# Patient Record
Sex: MALE | Race: WHITE | NOT HISPANIC OR LATINO | Employment: STUDENT | URBAN - METROPOLITAN AREA
[De-identification: names, ages, dates, MRNs, and addresses within clinical notes are randomized per-mention and may not be internally consistent; named-entity substitution may affect disease eponyms.]

---

## 2020-10-21 ENCOUNTER — LAB VISIT (OUTPATIENT)
Dept: LAB | Facility: HOSPITAL | Age: 20
End: 2020-10-21
Attending: FAMILY MEDICINE
Payer: COMMERCIAL

## 2020-10-21 DIAGNOSIS — A64 STD (MALE): ICD-10-CM

## 2020-10-21 DIAGNOSIS — Z09 FOLLOW UP: ICD-10-CM

## 2020-10-21 DIAGNOSIS — Z00.00 ANNUAL PHYSICAL EXAM: ICD-10-CM

## 2020-10-21 LAB
ALBUMIN SERPL BCP-MCNC: 4.2 G/DL (ref 3.5–5.2)
ALP SERPL-CCNC: 93 U/L (ref 55–135)
ALT SERPL W/O P-5'-P-CCNC: 32 U/L (ref 10–44)
ANION GAP SERPL CALC-SCNC: 11 MMOL/L (ref 8–16)
AST SERPL-CCNC: 15 U/L (ref 10–40)
BASOPHILS # BLD AUTO: 0.08 K/UL (ref 0–0.2)
BASOPHILS NFR BLD: 0.6 % (ref 0–1.9)
BILIRUB SERPL-MCNC: 1.2 MG/DL (ref 0.1–1)
BUN SERPL-MCNC: 10 MG/DL (ref 6–20)
CALCIUM SERPL-MCNC: 8.7 MG/DL (ref 8.7–10.5)
CHLORIDE SERPL-SCNC: 103 MMOL/L (ref 95–110)
CHOLEST SERPL-MCNC: 141 MG/DL (ref 120–199)
CHOLEST/HDLC SERPL: 4.1 {RATIO} (ref 2–5)
CO2 SERPL-SCNC: 26 MMOL/L (ref 23–29)
CREAT SERPL-MCNC: 1.1 MG/DL (ref 0.5–1.4)
DIFFERENTIAL METHOD: ABNORMAL
EOSINOPHIL # BLD AUTO: 0.1 K/UL (ref 0–0.5)
EOSINOPHIL NFR BLD: 1 % (ref 0–8)
ERYTHROCYTE [DISTWIDTH] IN BLOOD BY AUTOMATED COUNT: 14.6 % (ref 11.5–14.5)
EST. GFR  (AFRICAN AMERICAN): >60 ML/MIN/1.73 M^2
EST. GFR  (NON AFRICAN AMERICAN): >60 ML/MIN/1.73 M^2
ESTIMATED AVG GLUCOSE: 100 MG/DL (ref 68–131)
GLUCOSE SERPL-MCNC: 95 MG/DL (ref 70–110)
HBA1C MFR BLD HPLC: 5.1 % (ref 4–5.6)
HCT VFR BLD AUTO: 56.6 % (ref 40–54)
HDLC SERPL-MCNC: 34 MG/DL (ref 40–75)
HDLC SERPL: 24.1 % (ref 20–50)
HGB BLD-MCNC: 17.8 G/DL (ref 14–18)
IMM GRANULOCYTES # BLD AUTO: 0.05 K/UL (ref 0–0.04)
IMM GRANULOCYTES NFR BLD AUTO: 0.4 % (ref 0–0.5)
LDLC SERPL CALC-MCNC: 85.4 MG/DL (ref 63–159)
LYMPHOCYTES # BLD AUTO: 2.7 K/UL (ref 1–4.8)
LYMPHOCYTES NFR BLD: 21.3 % (ref 18–48)
MCH RBC QN AUTO: 27.5 PG (ref 27–31)
MCHC RBC AUTO-ENTMCNC: 31.4 G/DL (ref 32–36)
MCV RBC AUTO: 87 FL (ref 82–98)
MONOCYTES # BLD AUTO: 1 K/UL (ref 0.3–1)
MONOCYTES NFR BLD: 7.9 % (ref 4–15)
NEUTROPHILS # BLD AUTO: 8.7 K/UL (ref 1.8–7.7)
NEUTROPHILS NFR BLD: 68.8 % (ref 38–73)
NONHDLC SERPL-MCNC: 107 MG/DL
NRBC BLD-RTO: 0 /100 WBC
PLATELET # BLD AUTO: 251 K/UL (ref 150–350)
PMV BLD AUTO: 13.3 FL (ref 9.2–12.9)
POTASSIUM SERPL-SCNC: 3.5 MMOL/L (ref 3.5–5.1)
PROT SERPL-MCNC: 7.9 G/DL (ref 6–8.4)
RBC # BLD AUTO: 6.48 M/UL (ref 4.6–6.2)
SODIUM SERPL-SCNC: 140 MMOL/L (ref 136–145)
T3 SERPL-MCNC: 131 NG/DL (ref 60–180)
T4 FREE SERPL-MCNC: 1.29 NG/DL (ref 0.71–1.51)
TRIGL SERPL-MCNC: 108 MG/DL (ref 30–150)
TSH SERPL DL<=0.005 MIU/L-ACNC: 2.51 UIU/ML (ref 0.4–4)
WBC # BLD AUTO: 12.71 K/UL (ref 3.9–12.7)

## 2020-10-21 PROCEDURE — 80053 COMPREHEN METABOLIC PANEL: CPT

## 2020-10-21 PROCEDURE — 86696 HERPES SIMPLEX TYPE 2 TEST: CPT

## 2020-10-21 PROCEDURE — 87340 HEPATITIS B SURFACE AG IA: CPT

## 2020-10-21 PROCEDURE — 86803 HEPATITIS C AB TEST: CPT

## 2020-10-21 PROCEDURE — 86705 HEP B CORE ANTIBODY IGM: CPT

## 2020-10-21 PROCEDURE — 86592 SYPHILIS TEST NON-TREP QUAL: CPT

## 2020-10-21 PROCEDURE — 80061 LIPID PANEL: CPT

## 2020-10-21 PROCEDURE — 83036 HEMOGLOBIN GLYCOSYLATED A1C: CPT

## 2020-10-21 PROCEDURE — 84439 ASSAY OF FREE THYROXINE: CPT

## 2020-10-21 PROCEDURE — 36415 COLL VENOUS BLD VENIPUNCTURE: CPT

## 2020-10-21 PROCEDURE — 86709 HEPATITIS A IGM ANTIBODY: CPT

## 2020-10-21 PROCEDURE — 86703 HIV-1/HIV-2 1 RESULT ANTBDY: CPT

## 2020-10-21 PROCEDURE — 84480 ASSAY TRIIODOTHYRONINE (T3): CPT

## 2020-10-21 PROCEDURE — 84443 ASSAY THYROID STIM HORMONE: CPT

## 2020-10-21 PROCEDURE — 85025 COMPLETE CBC W/AUTO DIFF WBC: CPT

## 2020-10-22 LAB
HAV IGM SERPL QL IA: NEGATIVE
HBV CORE IGM SERPL QL IA: NEGATIVE
HBV SURFACE AG SERPL QL IA: NEGATIVE
HCV AB SERPL QL IA: NEGATIVE
HIV 1+2 AB+HIV1 P24 AG SERPL QL IA: NEGATIVE
RPR SER QL: NORMAL

## 2020-10-24 LAB
HSV1 IGG SERPL QL IA: NEGATIVE
HSV2 IGG SERPL QL IA: NEGATIVE

## 2020-11-09 ENCOUNTER — LAB VISIT (OUTPATIENT)
Dept: INTERNAL MEDICINE | Facility: CLINIC | Age: 20
End: 2020-11-09
Payer: COMMERCIAL

## 2020-11-09 DIAGNOSIS — Z03.818 ENCOUNTER FOR OBSERVATION FOR SUSPECTED EXPOSURE TO OTHER BIOLOGICAL AGENTS RULED OUT: ICD-10-CM

## 2020-11-09 LAB — SARS-COV-2 RNA RESP QL NAA+PROBE: NOT DETECTED

## 2020-11-09 PROCEDURE — U0003 INFECTIOUS AGENT DETECTION BY NUCLEIC ACID (DNA OR RNA); SEVERE ACUTE RESPIRATORY SYNDROME CORONAVIRUS 2 (SARS-COV-2) (CORONAVIRUS DISEASE [COVID-19]), AMPLIFIED PROBE TECHNIQUE, MAKING USE OF HIGH THROUGHPUT TECHNOLOGIES AS DESCRIBED BY CMS-2020-01-R: HCPCS

## 2021-03-10 ENCOUNTER — IMMUNIZATION (OUTPATIENT)
Dept: OBSTETRICS AND GYNECOLOGY | Facility: CLINIC | Age: 21
End: 2021-03-10
Payer: COMMERCIAL

## 2021-03-10 DIAGNOSIS — Z23 NEED FOR VACCINATION: Primary | ICD-10-CM

## 2021-03-10 PROCEDURE — 91300 COVID-19, MRNA, LNP-S, PF, 30 MCG/0.3 ML DOSE VACCINE: CPT | Mod: PBBFAC | Performed by: FAMILY MEDICINE

## 2021-03-31 ENCOUNTER — IMMUNIZATION (OUTPATIENT)
Dept: OBSTETRICS AND GYNECOLOGY | Facility: CLINIC | Age: 21
End: 2021-03-31
Payer: COMMERCIAL

## 2021-03-31 DIAGNOSIS — Z23 NEED FOR VACCINATION: Primary | ICD-10-CM

## 2021-03-31 PROCEDURE — 91300 COVID-19, MRNA, LNP-S, PF, 30 MCG/0.3 ML DOSE VACCINE: CPT | Mod: PBBFAC | Performed by: FAMILY MEDICINE

## 2021-03-31 PROCEDURE — 0002A COVID-19, MRNA, LNP-S, PF, 30 MCG/0.3 ML DOSE VACCINE: CPT | Mod: PBBFAC | Performed by: FAMILY MEDICINE

## 2021-07-31 ENCOUNTER — LAB VISIT (OUTPATIENT)
Dept: INTERNAL MEDICINE | Facility: CLINIC | Age: 21
End: 2021-07-31
Payer: COMMERCIAL

## 2021-07-31 DIAGNOSIS — Z03.818 ENCOUNTER FOR OBSERVATION FOR SUSPECTED EXPOSURE TO OTHER BIOLOGICAL AGENTS RULED OUT: ICD-10-CM

## 2021-07-31 PROCEDURE — U0003 INFECTIOUS AGENT DETECTION BY NUCLEIC ACID (DNA OR RNA); SEVERE ACUTE RESPIRATORY SYNDROME CORONAVIRUS 2 (SARS-COV-2) (CORONAVIRUS DISEASE [COVID-19]), AMPLIFIED PROBE TECHNIQUE, MAKING USE OF HIGH THROUGHPUT TECHNOLOGIES AS DESCRIBED BY CMS-2020-01-R: HCPCS | Performed by: FAMILY MEDICINE

## 2021-07-31 PROCEDURE — U0005 INFEC AGEN DETEC AMPLI PROBE: HCPCS | Performed by: FAMILY MEDICINE

## 2021-08-02 LAB
SARS-COV-2 RNA RESP QL NAA+PROBE: NOT DETECTED
SARS-COV-2- CYCLE NUMBER: -1

## 2021-10-21 ENCOUNTER — IMMUNIZATION (OUTPATIENT)
Dept: INTERNAL MEDICINE | Facility: CLINIC | Age: 21
End: 2021-10-21
Payer: COMMERCIAL

## 2021-10-21 DIAGNOSIS — Z23 NEED FOR VACCINATION: Primary | ICD-10-CM

## 2021-10-21 PROCEDURE — 91300 COVID-19, MRNA, LNP-S, PF, 30 MCG/0.3 ML DOSE VACCINE: CPT | Mod: PBBFAC | Performed by: INTERNAL MEDICINE

## 2021-10-21 PROCEDURE — 0003A COVID-19, MRNA, LNP-S, PF, 30 MCG/0.3 ML DOSE VACCINE: CPT | Mod: PBBFAC | Performed by: INTERNAL MEDICINE

## 2021-11-23 ENCOUNTER — CLINICAL SUPPORT (OUTPATIENT)
Dept: URGENT CARE | Facility: CLINIC | Age: 21
End: 2021-11-23
Payer: COMMERCIAL

## 2021-11-23 DIAGNOSIS — Z20.822 CONTACT WITH AND (SUSPECTED) EXPOSURE TO COVID-19: Primary | ICD-10-CM

## 2021-11-23 LAB
CTP QC/QA: YES
SARS-COV-2 RDRP RESP QL NAA+PROBE: NEGATIVE

## 2021-11-23 PROCEDURE — U0002: ICD-10-PCS | Mod: QW,S$GLB,,

## 2021-11-23 PROCEDURE — 99211 PR OFFICE/OUTPT VISIT, EST, LEVL I: ICD-10-PCS | Mod: S$GLB,CS,,

## 2021-11-23 PROCEDURE — U0002 COVID-19 LAB TEST NON-CDC: HCPCS | Mod: QW,S$GLB,,

## 2021-11-23 PROCEDURE — 99211 OFF/OP EST MAY X REQ PHY/QHP: CPT | Mod: S$GLB,CS,,

## 2021-12-17 ENCOUNTER — CLINICAL SUPPORT (OUTPATIENT)
Dept: URGENT CARE | Facility: CLINIC | Age: 21
End: 2021-12-17
Payer: COMMERCIAL

## 2021-12-17 DIAGNOSIS — Z20.822 ENCOUNTER FOR LABORATORY TESTING FOR COVID-19 VIRUS: Primary | ICD-10-CM

## 2021-12-17 LAB
CTP QC/QA: YES
SARS-COV-2 RDRP RESP QL NAA+PROBE: NEGATIVE

## 2021-12-17 PROCEDURE — U0002: ICD-10-PCS | Mod: QW,S$GLB,, | Performed by: PHYSICIAN ASSISTANT

## 2021-12-17 PROCEDURE — U0002 COVID-19 LAB TEST NON-CDC: HCPCS | Mod: QW,S$GLB,, | Performed by: PHYSICIAN ASSISTANT

## 2022-11-08 ENCOUNTER — HOSPITAL ENCOUNTER (EMERGENCY)
Facility: HOSPITAL | Age: 22
Discharge: HOME | End: 2022-11-08
Attending: EMERGENCY MEDICINE
Payer: COMMERCIAL

## 2022-11-08 VITALS
SYSTOLIC BLOOD PRESSURE: 179 MMHG | HEART RATE: 87 BPM | RESPIRATION RATE: 16 BRPM | WEIGHT: 315 LBS | HEIGHT: 73 IN | DIASTOLIC BLOOD PRESSURE: 86 MMHG | BODY MASS INDEX: 41.75 KG/M2 | OXYGEN SATURATION: 98 % | TEMPERATURE: 98 F

## 2022-11-08 DIAGNOSIS — N45.1 EPIDIDYMITIS, RIGHT: Primary | ICD-10-CM

## 2022-11-08 DIAGNOSIS — N50.811 PAIN IN RIGHT TESTICLE: ICD-10-CM

## 2022-11-08 LAB
BILIRUB UR QL STRIP: NEGATIVE
BUN SERPL-MCNC: 15 MG/DL (ref 6–30)
CHLORIDE SERPL-SCNC: 104 MMOL/L (ref 95–110)
CLARITY UR REFRACT.AUTO: CLEAR
COLOR UR AUTO: YELLOW
CREAT SERPL-MCNC: 1.2 MG/DL (ref 0.5–1.4)
GLUCOSE SERPL-MCNC: 92 MG/DL (ref 70–110)
GLUCOSE UR QL STRIP: NEGATIVE
HCT VFR BLD CALC: 50 %PCV (ref 36–54)
HCV AB SERPL QL IA: NORMAL
HGB UR QL STRIP: NEGATIVE
HIV 1+2 AB+HIV1 P24 AG SERPL QL IA: NORMAL
KETONES UR QL STRIP: NEGATIVE
LEUKOCYTE ESTERASE UR QL STRIP: NEGATIVE
NITRITE UR QL STRIP: NEGATIVE
PH UR STRIP: 7 [PH] (ref 5–8)
POC IONIZED CALCIUM: 1.15 MMOL/L (ref 1.06–1.42)
POC TCO2 (MEASURED): 26 MMOL/L (ref 23–29)
POTASSIUM BLD-SCNC: 3.9 MMOL/L (ref 3.5–5.1)
PROT UR QL STRIP: NEGATIVE
SAMPLE: NORMAL
SODIUM BLD-SCNC: 143 MMOL/L (ref 136–145)
SP GR UR STRIP: 1.02 (ref 1–1.03)
URN SPEC COLLECT METH UR: NORMAL

## 2022-11-08 PROCEDURE — 87591 N.GONORRHOEAE DNA AMP PROB: CPT | Mod: 59 | Performed by: EMERGENCY MEDICINE

## 2022-11-08 PROCEDURE — 86803 HEPATITIS C AB TEST: CPT | Performed by: EMERGENCY MEDICINE

## 2022-11-08 PROCEDURE — 87491 CHLMYD TRACH DNA AMP PROBE: CPT | Mod: 59 | Performed by: EMERGENCY MEDICINE

## 2022-11-08 PROCEDURE — 87389 HIV-1 AG W/HIV-1&-2 AB AG IA: CPT | Performed by: EMERGENCY MEDICINE

## 2022-11-08 PROCEDURE — 63600175 PHARM REV CODE 636 W HCPCS: Performed by: PHYSICIAN ASSISTANT

## 2022-11-08 PROCEDURE — 81003 URINALYSIS AUTO W/O SCOPE: CPT | Performed by: EMERGENCY MEDICINE

## 2022-11-08 PROCEDURE — 99284 EMERGENCY DEPT VISIT MOD MDM: CPT | Mod: ,,, | Performed by: PHYSICIAN ASSISTANT

## 2022-11-08 PROCEDURE — 99285 EMERGENCY DEPT VISIT HI MDM: CPT | Mod: 25

## 2022-11-08 PROCEDURE — 99284 PR EMERGENCY DEPT VISIT,LEVEL IV: ICD-10-PCS | Mod: ,,, | Performed by: PHYSICIAN ASSISTANT

## 2022-11-08 PROCEDURE — 87591 N.GONORRHOEAE DNA AMP PROB: CPT | Performed by: PHYSICIAN ASSISTANT

## 2022-11-08 PROCEDURE — 87491 CHLMYD TRACH DNA AMP PROBE: CPT | Performed by: PHYSICIAN ASSISTANT

## 2022-11-08 PROCEDURE — 96374 THER/PROPH/DIAG INJ IV PUSH: CPT

## 2022-11-08 RX ORDER — LEVOFLOXACIN 500 MG/1
500 TABLET, FILM COATED ORAL
Status: DISCONTINUED | OUTPATIENT
Start: 2022-11-08 | End: 2022-11-08 | Stop reason: HOSPADM

## 2022-11-08 RX ORDER — LEVOFLOXACIN 500 MG/1
500 TABLET, FILM COATED ORAL DAILY
Qty: 9 TABLET | Refills: 0 | Status: SHIPPED | OUTPATIENT
Start: 2022-11-08 | End: 2022-11-17

## 2022-11-08 RX ORDER — DEXTROAMPHETAMINE SACCHARATE, AMPHETAMINE ASPARTATE, DEXTROAMPHETAMINE SULFATE AND AMPHETAMINE SULFATE 3.125; 3.125; 3.125; 3.125 MG/1; MG/1; MG/1; MG/1
25 TABLET ORAL DAILY
COMMUNITY

## 2022-11-08 RX ORDER — KETOROLAC TROMETHAMINE 30 MG/ML
10 INJECTION, SOLUTION INTRAMUSCULAR; INTRAVENOUS
Status: COMPLETED | OUTPATIENT
Start: 2022-11-08 | End: 2022-11-08

## 2022-11-08 RX ORDER — CEFTRIAXONE 500 MG/1
250 INJECTION, POWDER, FOR SOLUTION INTRAMUSCULAR; INTRAVENOUS
Status: DISCONTINUED | OUTPATIENT
Start: 2022-11-08 | End: 2022-11-08 | Stop reason: HOSPADM

## 2022-11-08 RX ADMIN — KETOROLAC TROMETHAMINE 10 MG: 30 INJECTION, SOLUTION INTRAMUSCULAR; INTRAVENOUS at 08:11

## 2022-11-08 NOTE — FIRST PROVIDER EVALUATION
"Medical screening examination initiated.  I have conducted a focused provider triage encounter, findings are as follows:    Brief history of present illness:  21 y/o with right testicle pain and dysuria dark urine. No fevers, chills, N/V/Norm trauma. Started last week but worsened 2 days ago.     Vitals:    11/08/22 1709   BP: (!) 178/104   Pulse: 82   Resp: 16   Temp: 97.9 °F (36.6 °C)   TempSrc: Oral   SpO2: 99%   Weight: (!) 165.6 kg (365 lb)   Height: 6' 1" (1.854 m)       Pertinent physical exam:  deferred  exam to examining physician    Brief workup plan:  UA, testicular US, Istat, GC    Preliminary workup initiated; this workup will be continued and followed by the physician or advanced practice provider that is assigned to the patient when roomed.    Adarsh Elizabeth DO, FAAEM  Emergency Staff Physician   Dept of Emergency Medicine   [facility]  Spectralink: [id number]        Disclaimer: This note has been generated using voice-recognition software. There may be typographical errors that have been missed during proof-reading.    "

## 2022-11-09 LAB
C TRACH DNA SPEC QL NAA+PROBE: NOT DETECTED
N GONORRHOEA DNA SPEC QL NAA+PROBE: NOT DETECTED

## 2022-11-09 NOTE — DISCHARGE INSTRUCTIONS
Follow-up with Urology for further evaluation. Use the below contact information to obtain an appointment.    Take the prescribed Levaquin as directed for further management. Do not do strenuous exercise while taking this medication as it can cause ligament injuries.     You may use over-the-counter Tylenol and Ibuprofen for further treatment of your pain.

## 2022-11-09 NOTE — ED PROVIDER NOTES
Encounter Date: 11/8/2022       History     Chief Complaint   Patient presents with    Testicle Pain     Patient reports right testicle pain that started last week and symptoms worsened over the past two days. Patient reports dysuria and hematuria. Denies trauma.      The history is provided by the patient and medical records. No  was used.     Jl Cantor is a 22 y.o. male with medical history of Asperger syndrome presenting to the ED with the chief complaint of testicle pain.     Reports developing R testicle pain and swelling yesterday. Symptoms worsen whenever he sits down. Denies dysuria, hematuria, penile discharge, concerns for STD. He is not sexually active. He has not ejaculated. Denies history of pelvic or abdominal surgeries. No fever, chest pain, SOB, abdominal pain, vomiting, diarrhea, constipation.     Review of patient's allergies indicates:  No Known Allergies  Past Medical History:   Diagnosis Date    ADHD (attention deficit hyperactivity disorder)     Asperger syndrome      History reviewed. No pertinent surgical history.  History reviewed. No pertinent family history.  Social History     Tobacco Use    Smoking status: Every Day     Packs/day: 0.50     Types: Cigarettes    Smokeless tobacco: Never   Substance Use Topics    Alcohol use: Yes     Comment: weekly    Drug use: Never     Review of Systems   Constitutional:  Negative for fever.   HENT:  Negative for sore throat.    Eyes:  Negative for pain.   Respiratory:  Negative for shortness of breath.    Cardiovascular:  Negative for chest pain.   Gastrointestinal:  Negative for nausea.   Genitourinary:  Positive for scrotal swelling and testicular pain. Negative for dysuria and penile pain.   Musculoskeletal:  Negative for back pain.   Skin:  Negative for rash.   Neurological:  Negative for weakness.   Hematological:  Does not bruise/bleed easily.     Physical Exam     Initial Vitals [11/08/22 1709]   BP Pulse Resp Temp  SpO2   (!) 178/104 82 16 97.9 °F (36.6 °C) 99 %      MAP       --         Physical Exam    Constitutional: He appears well-developed and well-nourished. He is not diaphoretic. He is easily aroused.   Obese male   HENT:   Head: Normocephalic and atraumatic.   Mouth/Throat: Oropharynx is clear and moist. No oropharyngeal exudate.   Eyes: EOM and lids are normal. Pupils are equal, round, and reactive to light. No scleral icterus.   Neck: Phonation normal. Neck supple. No stridor present.   Normal range of motion.  Cardiovascular:  Normal rate and regular rhythm.           Pulmonary/Chest: Breath sounds normal. No stridor. No respiratory distress. He has no wheezes. He has no rales.   Abdominal: Abdomen is soft. He exhibits no distension. There is no abdominal tenderness. Hernia confirmed negative in the right inguinal area. There is no rebound.   Genitourinary: Right testis shows swelling and tenderness. Uncircumcised. No hypospadias, penile erythema or penile tenderness. No discharge found.   Musculoskeletal:         General: No tenderness or edema. Normal range of motion.      Cervical back: Normal range of motion and neck supple.     Neurological: He is alert, oriented to person, place, and time and easily aroused. He has normal strength. No sensory deficit.   Skin: Skin is warm and dry. No rash noted. No erythema.   Psychiatric: He has a normal mood and affect. His speech is normal.       ED Course   Procedures  Labs Reviewed   C. TRACHOMATIS/N. GONORRHOEAE BY AMP DNA   C. TRACHOMATIS/N. GONORRHOEAE BY AMP DNA   URINALYSIS, REFLEX TO URINE CULTURE    Narrative:     Specimen Source->Urine   HIV 1 / 2 ANTIBODY    Narrative:     Release to patient->Immediate   HEPATITIS C ANTIBODY    Narrative:     Release to patient->Immediate   ISTAT PROCEDURE   ISTAT CHEM8          Imaging Results              US Scrotum And Testicles (Final result)  Result time 11/08/22 19:03:12      Final result by Aric Charles MD (11/08/22  19:03:12)                   Impression:      Findings most consistent with right epididymitis, no orchitis at this time.  There is small reactive hydrocele.    Striated appearance of the right testicle, correlation with any history of previous injury or prior orchitis.  No vascular compromise at this time.    Right epididymal head cyst.    Electronically signed by resident: Дмитрий Landry  Date:    11/08/2022  Time:    18:50    Electronically signed by: Aric Charles MD  Date:    11/08/2022  Time:    19:03               Narrative:    EXAMINATION:  US SCROTUM AND TESTICLES    CLINICAL HISTORY:  Right testicular pain    TECHNIQUE:  Sonography of the scrotum and testes.    COMPARISON:  None.    FINDINGS:  The right testicle measures 5.5 x 3.5 x 3.3 cm with striated appearance..  Arterial and venous flow is documented to the right testicle without hyperemia.  There is a right epididymal head cyst measuring 0.7 cm.  The right epididymis is thickened measuring 1.1 cm with diffuse increased vascularity.  There is a small right hydrocele.  No right varicocele.    There is no right-sided varicocele.    The left testicle measures 5.6 x 3.3 x 4.2 cm with homogeneous echotexture.  Arterial and venous flow is documented to the left testicle without abnormal testicular or epididymal hyperemia. The left epididymis is unremarkable.    There is no left-sided varicocele or hydrocele.                                       Medications   cefTRIAXone injection 250 mg (has no administration in time range)   levoFLOXacin tablet 500 mg (has no administration in time range)   ketorolac injection 9.999 mg (9.999 mg Intravenous Given 11/8/22 2013)     Medical Decision Making:   History:   Old Medical Records: I decided to obtain old medical records.  Old Records Summarized: records from clinic visits.  Clinical Tests:   Lab Tests: Ordered and Reviewed  Radiological Study: Ordered and Reviewed     APC / Resident Notes:   22 y.o. male with  medical history of Asperger syndrome presenting to the ED c/o 2 days of R testicular pain and  swelling. DDx includes but not limited to orchitis, epididymitis, hydrocele, hernia, UTI.           ED Course as of 11/08/22 2203   Tue Nov 08, 2022 2111 U/S showing right epididymitis, no orchitis at this time. No torsion. UA negative for infection. [BA]   2112 Reports improvement in pain after Toradol [BA]   2112 Patient denies history of STI and has lower suspicion for one at this time. Will treat for both STI and enteric organism. Rocephin IM x1 given and RX for Levaquin provided. Levaquin precautions given. Patient expresses understanding and agreeable to the plan. Return to ED precautions given for new, worsening, or concerning symptoms. [BA]      ED Course User Index  [BA] Madi Braswell PA-C                 Clinical Impression:   Final diagnoses:  [N50.811] Pain in right testicle  [N45.1] Epididymitis, right (Primary)        ED Disposition Condition    Discharge Stable          ED Prescriptions       Medication Sig Dispense Start Date End Date Auth. Provider    levoFLOXacin (LEVAQUIN) 500 MG tablet Take 1 tablet (500 mg total) by mouth once daily. for 9 days 9 tablet 11/8/2022 11/17/2022 Madi Braswell PA-C          Follow-up Information       Follow up With Specialties Details Why Contact Info Additional Information    [facility]   [address]  [facility] [id number] [phone] Main Building, 4th Floor Please park in [facility] and take [facility] elevator             Madi Braswell PA-C  11/08/22 2203

## 2022-11-09 NOTE — ED NOTES
Patient identifiers verified and correct for Mr Cantor  C/C:  Testicle pain SEE NN  APPEARANCE: awake and alert in NAD.  SKIN: warm, dry and intact. No breakdown or bruising.  MUSCULOSKELETAL: Patient moving all extremities spontaneously, no obvious swelling or deformities noted. Ambulates independently.  RESPIRATORY: Denies shortness of breath.Respirations unlabored.   CARDIAC: Denies CP, 2+ distal pulses; no peripheral edema  ABDOMEN: S/ND/NT, Denies nausea  : voids spontaneously, denies difficulty  Neurologic: AAO x 4; follows commands equal strength in all extremities; denies numbness/tingling. Denies dizziness  Denies new weakness, reports right testicle pain , not observed

## 2022-11-09 NOTE — ED NOTES
I-STAT Chem-8+ Results:   Value Reference Range   Sodium 143 136-145 mmol/L   Potassium  3.9 3.5-5.1 mmol/L   Chloride 104  mmol/L   Ionized Calcium 1.15 1.06-1.42 mmol/L   CO2 (measured) 26 23-29 mmol/L   Glucose 92  mg/dL   BUN 15 6-30 mg/dL   Creatinine 1.2 0.5-1.4 mg/dL   Hematocrit 50 36-54%

## 2025-03-13 ENCOUNTER — OFFICE VISIT (OUTPATIENT)
Dept: INTERNAL MEDICINE | Facility: CLINIC | Age: 25
End: 2025-03-13
Payer: COMMERCIAL

## 2025-03-13 VITALS
SYSTOLIC BLOOD PRESSURE: 122 MMHG | OXYGEN SATURATION: 100 % | BODY MASS INDEX: 41.75 KG/M2 | HEART RATE: 79 BPM | WEIGHT: 315 LBS | DIASTOLIC BLOOD PRESSURE: 87 MMHG | HEIGHT: 73 IN

## 2025-03-13 DIAGNOSIS — Z01.30 ENCOUNTER FOR BLOOD PRESSURE EXAMINATION: ICD-10-CM

## 2025-03-13 DIAGNOSIS — F90.9 ATTENTION DEFICIT HYPERACTIVITY DISORDER (ADHD), UNSPECIFIED ADHD TYPE: ICD-10-CM

## 2025-03-13 DIAGNOSIS — I10 PRIMARY HYPERTENSION: Primary | ICD-10-CM

## 2025-03-13 PROCEDURE — 99999 PR PBB SHADOW E&M-EST. PATIENT-LVL IV: CPT | Mod: PBBFAC,,, | Performed by: NURSE PRACTITIONER

## 2025-03-13 RX ORDER — ATENOLOL 50 MG/1
75 TABLET ORAL DAILY
Start: 2025-03-13

## 2025-03-13 RX ORDER — TIRZEPATIDE 2.5 MG/.5ML
2.5 INJECTION, SOLUTION SUBCUTANEOUS
COMMUNITY
Start: 2025-02-13

## 2025-03-13 NOTE — Clinical Note
Renetta Brito,  Wanted to respectfully share chart notations from the visit today on [facility].. please see the attached.   Best,  ` SDJ

## 2025-03-13 NOTE — PROGRESS NOTES
"INTERNAL MEDICINE CLINIC - SAME DAY APPOINTMENT  Progress Note    PRESENTING HISTORY     PCP: Shiv Brito MD    Chief Complaint/Reason for Visit:   No chief complaint on file.     History of Present Illness & ROS : Mr. Jl Cantor is a 24 y.o. male.    Same day apt.   New to me and practice site.   Pleasant gentleman.   Est;d with with Dr. Brito and reports that he missed to his follow up since being started on Atenolol, not been consistently monitoring at home, but his Psychiatrist 'will not refill the "Vyvanse due to elevated blood pressures when gets to clinic'. He denies missing any of his Atenolol. Does endorse 'lots of stress at work'. Does endorse 'headaches' on occasion, no chest pain or SOB or cough endorsed.   No dizziness or vision changes.   He does take the Atenolol in the morning and reports, 'typically go see my Psychiatrist shortly afterwards'.   Of note to make, BP is in acceptable range this evening : 122/87, HR: 79.   He is requesting to 'see what can be done for blood pressures'.   He reports the following from when seen by Dr. Dacosta, Psychiatrist at [facility] on the most recent visits since starting the Atenolol:   12/10/2024: 184/94 (HR 85)  01/09/2025: 195/85 (HR 80)  02/06/2025: 177/93 (HR 75)  03/11/2025: 196/94 (HR 84)  Today in clinic with me: 122/87 (HR 79)    Review of Systems:  Eyes: denies visual changes at this time denies floaters   ENT: no nasal congestion or sore throat  Respiratory: no cough or shorness of breath  Cardiovascular: no chest pain or palpitations  Gastrointestinal: no nausea or vomiting, no abdominal pain or change in bowel habits  Genitourinary: no hematuria or dysuria; denies frequency  Hematologic/Lymphatic: no easy bruising or lymphadenopathy  Musculoskeletal: no arthralgias or myalgias  Neurological: no seizures or tremors  Endocrine: no heat or cold intolerance      PAST HISTORY:     Past Medical History:   Diagnosis Date    ADHD (attention deficit " hyperactivity disorder)     Asperger syndrome        No past surgical history on file.    No family history on file.    Social History     Socioeconomic History    Marital status: Single   Tobacco Use    Smoking status: Every Day     Current packs/day: 0.50     Types: Cigarettes    Smokeless tobacco: Never   Substance and Sexual Activity    Alcohol use: Yes     Comment: weekly    Drug use: Never     Social Drivers of Health     Financial Resource Strain: Medium Risk (3/13/2025)    Overall Financial Resource Strain (CARDIA)     Difficulty of Paying Living Expenses: Somewhat hard   Food Insecurity: No Food Insecurity (3/13/2025)    Hunger Vital Sign     Worried About Running Out of Food in the Last Year: Never true     Ran Out of Food in the Last Year: Never true   Transportation Needs: No Transportation Needs (3/13/2025)    PRAPARE - Transportation     Lack of Transportation (Medical): No     Lack of Transportation (Non-Medical): No   Physical Activity: Insufficiently Active (3/13/2025)    Exercise Vital Sign     Days of Exercise per Week: 2 days     Minutes of Exercise per Session: 30 min   Stress: Stress Concern Present (3/13/2025)    [employer] - Occupational Stress Questionnaire     Feeling of Stress : Rather much   Housing Stability: Low Risk  (3/13/2025)    Housing Stability Vital Sign     Unable to Pay for Housing in the Last Year: No     Number of Times Moved in the Last Year: 0     Homeless in the Last Year: No       MEDICATIONS & ALLERGIES:     Medications Ordered Prior to Encounter[1]     Review of patient's allergies indicates:  No Known Allergies    Medications Reconciliation:   I have reconciled the patient's home medications with the patient/family. I have updated all changes.  Refer to After-Visit Medication List.    OBJECTIVE:     Vital Signs:  There were no vitals filed for this visit.  Wt Readings from Last 3 Encounters:   11/08/22 1709 (!) 165.6 kg (365 lb)   06/01/17  0827 (!) 142.5 kg (314 lb 3.2 oz) (>99%, Z= 3.17)*   05/29/17 0816 (!) 141.5 kg (312 lb) (>99%, Z= 3.15)*     * Growth percentiles are based on [facility] (Boys, 2-20 Years) data.     There is no height or weight on file to calculate BMI.   Wt Readings from Last 3 Encounters:   03/13/25 (!) 166 kg (365 lb 15.4 oz)   11/08/22 (!) 165.6 kg (365 lb)   06/01/17 (!) 142.5 kg (314 lb 3.2 oz) (>99%, Z= 3.17)*     * Growth percentiles are based on CDC (Boys, 2-20 Years) data.     Temp Readings from Last 3 Encounters:   11/08/22 97.9 °F (36.6 °C) (Oral)   06/01/17 96.4 °F (35.8 °C) (Skin)   05/29/17 98.3 °F (36.8 °C) (Skin)     BP Readings from Last 3 Encounters:   03/13/25 122/87   11/08/22 (!) 179/86   06/01/17 (!) 146/79     Pulse Readings from Last 3 Encounters:   03/13/25 79   11/08/22 87   06/01/17 79     Physical Exam:  General: Well developed, well nourished. No distress.  HEENT: Head is normocephalic, atraumatic  Eyes: Clear conjunctiva.  Neck: Supple, symmetrical neck; trachea midline.  Lungs: Clear to auscultation bilaterally and normal respiratory effort.  Cardiovascular: Heart with regular rate and rhythm. No murmurs, gallops or rubs  Skin: Skin color, texture, turgor normal. No rashes.  Musculoskeletal: Normal gait.   Neurologic: Normal strength and tone. No focal numbness or weakness.     Laboratory  Lab Results   Component Value Date    WBC 12.71 (H) 10/21/2020    HGB 17.8 10/21/2020    HCT 50 11/08/2022     10/21/2020    CHOL 141 10/21/2020    TRIG 108 10/21/2020    HDL 34 (L) 10/21/2020    ALT 32 10/21/2020    AST 15 10/21/2020     10/21/2020    K 3.5 10/21/2020     10/21/2020    CREATININE 1.1 10/21/2020    BUN 10 10/21/2020    CO2 26 10/21/2020    TSH 2.509 10/21/2020    HGBA1C 5.1 10/21/2020       ASSESSMENT & PLAN:     Same day apt  History:   Est;d with with Dr. Brito and reports that he missed to his follow up since being started on Atenolol, not been consistently monitoring at home, but  "his Psychiatrist 'will not refill the "Vyvanse due to elevated blood pressures when gets to clinic'. He denies missing any of his Atenolol. Does endorse 'lots of stress at work'. Does endorse 'headaches' on occasion, no chest pain or SOB or cough endorsed.   No dizziness or vision changes.   He does take the Atenolol in the morning and reports, 'typically go see my Psychiatrist shortly afterwards'.   Of note to make, BP is in acceptable range this evening : 122/87, HR: 79.   He is requesting to 'see what can be done for blood pressures'.     He reports the following from when seen by Dr. Dacosta, Psychiatrist at [facility] on the most recent visits since starting the Atenolol:   12/10/2024: 184/94 (HR 85)  01/09/2025: 195/85 (HR 80)  02/06/2025: 177/93 (HR 75)  03/11/2025: 196/94 (HR 84)  Today in clinic with me: 122/87 (HR 79)    Primary hypertension  Encounter for blood pressure examination  *Notations made to 2/2023 and 11/2024 with PCP, Dr. Brito in regards to his BP; unable to review chart notations; outside of [facility]; however, patient was able to access his BPs when seen by [facility] Psychiatrist and shared with me today.   Recommend marginal soft increase in the Atenolol from 50 to 75, check BP daily for the next week, take the Atenolol in the p.m. vs a.m. and schedule timely follow up with Dr. Brito   -     atenoloL (TENORMIN) 50 MG tablet; Take 1.5 tablets (75 mg total) by mouth Daily.      Attention deficit hyperactivity disorder (ADHD), unspecified ADHD type  Followed by Dr. Guerrero; defer to managing prescriber  ` Vyvanse  ` Lamictal  ` Xanax    *Same day apt and recommend follow up with his PCP, Dr. Brito. Have shared notations with him today.      Future Appointments:  No future appointments.       Medication List            Accurate as of March 13, 2025  6:13 PM. If you have any questions, ask your nurse or doctor.                CHANGE how you take these medications      * ALPRAZolam 0.25 MG tablet  Commonly known " as: XANAX  Take 1 tablet (0.25 mg total) by mouth daily as needed.  What changed: Another medication with the same name was removed. Continue taking this medication, and follow the directions you see here.  Changed by: JOSE Andrade     * ALPRAZolam 0.25 MG tablet  Commonly known as: XANAX  Take 1 tablet by mouth nightly as needed (breakthrough anxiety or panic symptoms)  What changed: Another medication with the same name was removed. Continue taking this medication, and follow the directions you see here.  Changed by: JOSE Andrade     * ALPRAZolam 0.25 MG tablet  Commonly known as: XANAX  Take 1 tablet by mouth nightly as needed for Sleep  What changed: Another medication with the same name was removed. Continue taking this medication, and follow the directions you see here.  Changed by: JOSE Andrade     atenoloL 50 MG tablet  Commonly known as: TENORMIN  Take 1.5 tablets (75 mg total) by mouth Daily.  What changed: how much to take  Changed by: JOSE Andrade     lisdexamfetamine 60 MG capsule  Commonly known as: VYVANSE  Take 1 capsule by mouth every morning Max Daily Amount: 60 mg  What changed: Another medication with the same name was removed. Continue taking this medication, and follow the directions you see here.  Changed by: JOSE Andrade     traZODone 50 MG tablet  Commonly known as: DESYREL  Take 2 tablets by mouth nightly as needed for Sleep  What changed: Another medication with the same name was removed. Continue taking this medication, and follow the directions you see here.  Changed by: JOSE Andrade           * This list has 3 medication(s) that are the same as other medications prescribed for you. Read the directions carefully, and ask your doctor or other care provider to review them with you.                CONTINUE taking these medications      guanFACINE 1 MG Tab  Commonly known as: TENEX  Take 1 tablet by  mouth nightly     lamoTRIgine 100 MG tablet  Commonly known as: LAMICTAL  Take 1 tablet by mouth daily     MOUNJARO 2.5 mg/0.5 mL Pnij  Generic drug: tirzepatide            STOP taking these medications      ADDERALL XR 25 mg 24 hr capsule  Generic drug: dextroamphetamine-amphetamine  Stopped by: JOSE Andrade     buPROPion 300 MG 24 hr tablet  Commonly known as: WELLBUTRIN XL  Stopped by: JOSE Andrade     dextroamphetamine-amphetamine 12.5 MG tablet  Commonly known as: ADDERALL  Stopped by: JOSE Andrade     dextroamphetamine-amphetamine 25 MG 24 hr capsule  Commonly known as: ADDERALL XR  Stopped by: JOSE Andrade     dextroamphetamine-amphetamine 30 mg Tab  Commonly known as: AdderalL  Stopped by: JOSE Andrade     fluticasone propionate 50 mcg/actuation nasal spray  Commonly known as: FLONASE  Stopped by: JOSE Andrade     hydrOXYzine pamoate 25 MG Cap  Commonly known as: VistariL  Stopped by: JOSE Andrade     metoprolol succinate 25 MG 24 hr tablet  Commonly known as: TOPROL-XL  Stopped by: JOSE Andrade     triamcinolone acetonide 0.1% 0.1 % ointment  Commonly known as: KENALOG  Stopped by: JOSE Andrade               Where to Get Your Medications        Information about where to get these medications is not yet available    Ask your nurse or doctor about these medications  atenoloL 50 MG tablet         Signing Physician:  JOSE Andrade         [1]   Current Outpatient Medications on File Prior to Visit   Medication Sig Dispense Refill    ALPRAZolam (XANAX) 0.25 MG tablet Take 1 tablet (0.25 mg total) by mouth daily as needed. 30 tablet 0    ALPRAZolam (XANAX) 0.25 MG tablet Take 1 tablet by mouth nightly as needed (breakthrough anxiety or panic symptoms) 30 tablet 0    ALPRAZolam (XANAX) 0.25 MG tablet Take 1 tablet (0.25 mg total) by mouth nightly as needed for sleep.  30 tablet 0    ALPRAZolam (XANAX) 0.25 MG tablet Take 1 tablet by mouth nightly as needed for Sleep 30 tablet 0    atenoloL (TENORMIN) 50 MG tablet Take 1 tablet (50 mg total) by mouth Daily. 30 tablet 3    buPROPion (WELLBUTRIN XL) 300 MG 24 hr tablet TAKE 1 TABLET BY MOUTH EVERY DAY 90 tablet 0    buPROPion (WELLBUTRIN XL) 300 MG 24 hr tablet TAKE 1 TABLET BY MOUTH EVERY DAY 90 tablet 0    buPROPion (WELLBUTRIN XL) 300 MG 24 hr tablet Take 1 tablet by mouth every morning 30 tablet 0    buPROPion (WELLBUTRIN XL) 300 MG 24 hr tablet Take 1 tablet by mouth every morning 30 tablet 0    buPROPion (WELLBUTRIN XL) 300 MG 24 hr tablet Take 1 tablet by mouth every morning 30 tablet 0    dextroamphetamine-amphetamine (ADDERALL XR) 25 MG 24 hr capsule Take 1 Capsule(s) by mouth once a day 30 capsule 0    dextroamphetamine-amphetamine (ADDERALL XR) 25 MG 24 hr capsule 1 Capsule(s) once a day 30 capsule 0    dextroamphetamine-amphetamine (ADDERALL XR) 25 MG 24 hr capsule Take 1 Capsule by mouth  once a day 30 capsule 0    dextroamphetamine-amphetamine (ADDERALL XR) 25 MG 24 hr capsule Take 1 Capsule by mouth once a day 30 capsule 0    dextroamphetamine-amphetamine (ADDERALL XR) 25 MG 24 hr capsule Take 1 Capsule by mouth once a day 30 capsule 0    dextroamphetamine-amphetamine (ADDERALL XR) 25 MG 24 hr capsule Take 1 capsule (25 mg total) by mouth once daily. 30 capsule 0    dextroamphetamine-amphetamine (ADDERALL XR) 25 MG 24 hr capsule 1 Capsule(s) once a day 30 capsule 0    dextroamphetamine-amphetamine (ADDERALL XR) 25 MG 24 hr capsule 1 Capsule(s) once a day 30 capsule 0    dextroamphetamine-amphetamine (ADDERALL) 12.5 MG tablet Take 25 mg by mouth once daily.      dextroamphetamine-amphetamine (ADDERALL) 30 mg Tab Take half tablet 2 times a day 30 tablet 0    fluticasone (FLONASE) 50 mcg/actuation nasal spray 1 spray by Each Nare route once daily. 16 g 1    guanFACINE (TENEX) 1 MG Tab Take 1 tablet by mouth nightly 30  tablet 1    hydrOXYzine pamoate (VISTARIL) 25 MG Cap Take 1 Capsule by mouth once a day as needed 30 capsule 0    hydrOXYzine pamoate (VISTARIL) 25 MG Cap Take 1 capsule by mouth once a day as needed 90 capsule 0    lamoTRIgine (LAMICTAL) 100 MG tablet Take 1 tablet by mouth daily 90 tablet 1    lisdexamfetamine (VYVANSE) 60 MG capsule Take 1 capsule by mouth every morning Max Daily Amount: 60 mg 30 capsule 0    lisdexamfetamine (VYVANSE) 60 MG capsule Take 1 capsule by mouth every morning Max Daily Amount: 60 mg 30 capsule 0    metoprolol succinate (TOPROL-XL) 25 MG 24 hr tablet Take 1 tablet (25 mg total) by mouth once daily. 90 tablet 1    metoprolol succinate (TOPROL-XL) 25 MG 24 hr tablet Take 1 tablet (25 mg total) by mouth once daily. 90 tablet 1    traZODone (DESYREL) 50 MG tablet Take 1 tablet by mouth nightly 30 tablet 0    traZODone (DESYREL) 50 MG tablet Take 1 tablet by mouth nightly 30 tablet 0    traZODone (DESYREL) 50 MG tablet Take 2 tablets by mouth nightly as needed for Sleep 180 tablet 0    triamcinolone acetonide 0.1% (KENALOG) 0.1 % ointment Apply topically 2 (two) times daily. To affected area 30 g 0     No current facility-administered medications on file prior to visit.      Oriented - self; Oriented - place; Oriented - time

## 2025-03-21 RX ORDER — ATENOLOL 50 MG/1
75 TABLET ORAL DAILY
Qty: 45 TABLET | Refills: 1 | Status: SHIPPED | OUTPATIENT
Start: 2025-03-21 | End: 2025-05-20

## 2025-03-21 NOTE — TELEPHONE ENCOUNTER
Refill Routing Note   Medication(s) are not appropriate for processing by [facility] for the following reason(s):        Non-participating provider    [facility] action(s):  Route               Appointments  past 12m or future 3m with PCP    Date Provider   Last Visit   3/13/2025 Betsy Padilla FNP   Next Visit   Visit date not found Betsy Padilla FNP   ED visits in past 90 days: 0        Note composed:11:27 AM 03/21/2025